# Patient Record
Sex: MALE | Race: WHITE | Employment: UNEMPLOYED | URBAN - METROPOLITAN AREA
[De-identification: names, ages, dates, MRNs, and addresses within clinical notes are randomized per-mention and may not be internally consistent; named-entity substitution may affect disease eponyms.]

---

## 2019-03-12 ENCOUNTER — OFFICE VISIT (OUTPATIENT)
Dept: FAMILY MEDICINE | Facility: CLINIC | Age: 64
End: 2019-03-12

## 2019-03-12 VITALS
RESPIRATION RATE: 18 BRPM | HEART RATE: 83 BPM | HEIGHT: 61 IN | OXYGEN SATURATION: 95 % | WEIGHT: 247 LBS | BODY MASS INDEX: 46.63 KG/M2 | SYSTOLIC BLOOD PRESSURE: 164 MMHG | TEMPERATURE: 99.7 F | DIASTOLIC BLOOD PRESSURE: 86 MMHG

## 2019-03-12 DIAGNOSIS — J45.20 MILD INTERMITTENT ASTHMA WITHOUT COMPLICATION: ICD-10-CM

## 2019-03-12 DIAGNOSIS — E66.01 MORBID OBESITY (H): ICD-10-CM

## 2019-03-12 DIAGNOSIS — R05.9 COUGH: Primary | ICD-10-CM

## 2019-03-12 PROCEDURE — 99213 OFFICE O/P EST LOW 20 MIN: CPT | Performed by: FAMILY MEDICINE

## 2019-03-12 ASSESSMENT — MIFFLIN-ST. JEOR: SCORE: 1788.76

## 2019-03-12 NOTE — PROGRESS NOTES
Chief Complaint   Patient presents with     Cough     Pt here for cough/uri.     Health Maintenance     Pt reminded he is due for injections and colonoscopy, no health insurance.         ENT Symptoms             Symptoms: cc Present Absent Comment   Fever/Chills  x  Did not measure temp at home; patient feels he is feverish.   Fatigue  x  Due to cough   Muscle Aches   x    Eye Irritation   x    Sneezing  x     Nasal Bony/Drg  x  little   Sinus Pressure/Pain   x    Loss of smell   x    Dental pain   x    Sore Throat  x  Little-maybe from cough or inhaler   Swollen Glands   x    Ear Pain/Fullness   x    Cough x      Wheeze x      Chest Pain  x  Feels fullness   Shortness of breath  x     Rash   x    Other  x  headache     Symptom duration:  3 days - started with deep cough right away.   Symptom severity:  severe   Treatments tried:  advil, cough gtts - decreased headache, but no relief from cough.   Contacts:  coworkers have pneumonia - [employer].     Patient denies recent travel.  Patient states he last took Ibuprofen 2-3 hours ago.  Patient states he was diagnosed with asthma since he was a child - recently, usually triggered by laughing, fumes, smokes.  Patient states he rarely needs to use inhaler.  Patient denies having received flu vaccine.    Problem list and histories reviewed & adjusted, as indicated.  Additional history: as documented    Patient Active Problem List   Diagnosis     Asthma exacerbation     Acute respiratory failure with hypoxia (H)     Asthma     Hypoxia     Morbid obesity (H)     Past Surgical History:   Procedure Laterality Date     NO HISTORY OF SURGERY         Social History     Tobacco Use     Smoking status: Never Smoker     Smokeless tobacco: Never Used   Substance Use Topics     Alcohol use: Yes     Comment: rarely     Family History   Problem Relation Age of Onset     Cancer - colorectal Father 75     Cancer - colorectal Maternal Grandfather 85     Multiple Sclerosis Mother   "    C.A.D. No family hx of      Diabetes No family hx of          Current Outpatient Medications   Medication Sig Dispense Refill     albuterol (PROAIR HFA, PROVENTIL HFA, VENTOLIN HFA) 108 (90 BASE) MCG/ACT inhaler Inhale 2 puffs into the lungs every 4 hours as needed for shortness of breath / dyspnea or wheezing 1 Inhaler 1     No Known Allergies    Reviewed and updated as needed this visit by clinical staff  Tobacco  Allergies  Meds  Med Hx  Surg Hx  Fam Hx  Soc Hx      Reviewed and updated as needed this visit by Provider         ROS:  C: NEGATIVE for fever, chills, change in weight  I: NEGATIVE for worrisome rashes, moles or lesions  E: NEGATIVE for vision changes or irritation  ENT/MOUTH: see above  RESP:as above  CV: NEGATIVE for chest pain, palpitations or peripheral edema  GI: NEGATIVE for nausea, abdominal pain, heartburn, or change in bowel habits  M: NEGATIVE for significant arthralgias or myalgia    OBJECTIVE:                                                    /86   Pulse 83   Temp 99.7  F (37.6  C) (Tympanic)   Resp 18   Ht 1.549 m (5' 1\")   Wt 112 kg (247 lb)   SpO2 95%   BMI 46.67 kg/m    Body mass index is 46.67 kg/m .  GENERAL: well-nourished,  alert and no distress  EYES: pink conjunctivae, no icterus  NECK: moderate cervical adenopathy, nontender  HEENT: tympanic membrane intact and pearly bilaterally, nose with moderate congestion, no sinus tenderness, throat mildly erythematous with moderate amount of mucoid postnasal drainage, tonsils grade +1 w/ no exudates, no oral ulcers  RESP: good  air entry, equal breath sounds, no wheezing all lung fields; one time soft crackles on LLL but on repeat auscultation that resolved; mild bronchial breath sounds   CV: normal rate, regular rhythm, normal S1 S2, no S3 or S4 and no murmur  SKIN:  Good turgor, no rashes    Diagnostic test results:  Diagnostic Test Results:  No results found for this or any previous visit (from the past 24 " "hour(s)).     ASSESSMENT/PLAN:                                                        ICD-10-CM    1. Cough R05    2. Mild intermittent asthma without complication J45.20    3. Morbid obesity (H) E66.01      Patient was advised of exam findings.  No sign to support acute asthma exacerbation, nor strongly support pneumonia.  More consistent with viral URI with no bronchospasm active on encounter. Less likely but still possible early pneumonia.  There was questionable left lower lung crackles, so CXR was advised to be obtained.  Patient declined due to concern about cost of the imaging.  Patient was advised it will be helpful to obtain imaging to arrive an accurate diagnosis and to direct appropriate treatment.  He then asked why he can't just be given \"prednisone and z-pack\". He was advised of indications for both meds, and he has no clear signs on exam for either.  He was advised of possible adverse effects if either med is prescribed with no clinical indication.  He then said \"I guess we're done here and I'll just go home.\".  He was advised to return to clinic if he has worsening or persistent symptoms.    Follow up with Provider - prn   Patient Instructions   Patient walked out of room before AVS could be written and printed.      Denis Layne MD  [facility]  "

## 2019-05-07 ENCOUNTER — TELEPHONE (OUTPATIENT)
Dept: FAMILY MEDICINE | Facility: CLINIC | Age: 64
End: 2019-05-07

## 2021-04-03 ENCOUNTER — HEALTH MAINTENANCE LETTER (OUTPATIENT)
Age: 66
End: 2021-04-03

## 2021-09-18 ENCOUNTER — HEALTH MAINTENANCE LETTER (OUTPATIENT)
Age: 66
End: 2021-09-18

## 2022-04-24 ENCOUNTER — HEALTH MAINTENANCE LETTER (OUTPATIENT)
Age: 67
End: 2022-04-24

## 2022-11-19 ENCOUNTER — HEALTH MAINTENANCE LETTER (OUTPATIENT)
Age: 67
End: 2022-11-19

## 2023-06-01 ENCOUNTER — HEALTH MAINTENANCE LETTER (OUTPATIENT)
Age: 68
End: 2023-06-01

## 2024-04-08 ENCOUNTER — TELEPHONE (OUTPATIENT)
Dept: FAMILY MEDICINE | Facility: CLINIC | Age: 69
End: 2024-04-08

## 2024-04-08 DIAGNOSIS — Z12.11 COLON CANCER SCREENING: Primary | ICD-10-CM

## 2024-04-08 NOTE — TELEPHONE ENCOUNTER
"Patient's call transferred to author    Patient wondering if Dr. Manzano was considered his PCP  States he saw Dr. Manzano when he was hospitalized years ago  States that Dr. Manzano had asked if patient had a PCP and patient answered no - states Dr. Manzano replied with \"well, now you got one\"      Patient states he never goes to the Doctor and had never actually scheduled an appointment with Dr. Manzano  Patient now filling out Medicare paperwork and he needs to list his PCP - patient unsure who to put down    Relayed that Dr. Manzano's practice is currently closed, but would forward a message for him to review     Patient understands provider may or may not accept patient to his practice   Would like call back with answer    Aron Kumar [facility] RN  [facility]     "

## 2024-04-10 NOTE — TELEPHONE ENCOUNTER
Call placed to patient   Patient's wife answered; no details provided; requested patient call back at [phone]    Aron Kumar, Clinic RN  .[facility]

## 2024-04-15 NOTE — TELEPHONE ENCOUNTER
Call placed to patient   Relayed Dr. Manzano's message    Patient verbalized understanding  Will schedule Colonoscopy and establish care visit with Dr. Manzano   No further questions/concerns    Aron Kumar, Clinic RN  [facility]

## 2024-06-16 ENCOUNTER — HEALTH MAINTENANCE LETTER (OUTPATIENT)
Age: 69
End: 2024-06-16